# Patient Record
Sex: FEMALE | Race: WHITE | URBAN - METROPOLITAN AREA
[De-identification: names, ages, dates, MRNs, and addresses within clinical notes are randomized per-mention and may not be internally consistent; named-entity substitution may affect disease eponyms.]

---

## 2017-03-23 ENCOUNTER — TELEPHONE (OUTPATIENT)
Dept: INTERNAL MEDICINE CLINIC | Age: 47
End: 2017-03-23

## 2017-03-23 NOTE — TELEPHONE ENCOUNTER
TC: pt states Tuesday she woke up with a spot on her lip, looked like a cold sore , tingling , red and swollen this am, also tingling going up her face, concerns, lives in [facility], has Urgent Care there , advised to walk in and be evaluated pt agrees, no other symptyoms.

## 2017-03-29 ENCOUNTER — OFFICE VISIT (OUTPATIENT)
Dept: INTERNAL MEDICINE CLINIC | Age: 47
End: 2017-03-29

## 2017-03-29 VITALS
HEIGHT: 69 IN | RESPIRATION RATE: 18 BRPM | HEART RATE: 75 BPM | OXYGEN SATURATION: 99 % | SYSTOLIC BLOOD PRESSURE: 105 MMHG | TEMPERATURE: 98.5 F | DIASTOLIC BLOOD PRESSURE: 66 MMHG | WEIGHT: 144 LBS | BODY MASS INDEX: 21.33 KG/M2

## 2017-03-29 DIAGNOSIS — E55.9 VITAMIN D DEFICIENCY: ICD-10-CM

## 2017-03-29 DIAGNOSIS — Z00.00 WELL ADULT EXAM: ICD-10-CM

## 2017-03-29 DIAGNOSIS — B02.9 HERPES ZOSTER WITHOUT COMPLICATION: Primary | ICD-10-CM

## 2017-03-29 DIAGNOSIS — E03.4 HYPOTHYROIDISM DUE TO ACQUIRED ATROPHY OF THYROID: ICD-10-CM

## 2017-03-29 RX ORDER — HYDROCODONE BITARTRATE AND ACETAMINOPHEN 5; 325 MG/1; MG/1
TABLET ORAL
COMMUNITY
End: 2018-04-20 | Stop reason: ALTCHOICE

## 2017-03-29 RX ORDER — VALACYCLOVIR HYDROCHLORIDE 1 G/1
1000 TABLET, FILM COATED ORAL 3 TIMES DAILY
COMMUNITY
End: 2018-04-20 | Stop reason: ALTCHOICE

## 2017-03-29 RX ORDER — CLONAZEPAM 0.5 MG/1
0.25 TABLET ORAL
Qty: 15 TAB | Refills: 0 | Status: SHIPPED | OUTPATIENT
Start: 2017-03-29 | End: 2019-04-22 | Stop reason: ALTCHOICE

## 2017-03-29 RX ORDER — MELATONIN
DAILY
COMMUNITY
End: 2017-03-29 | Stop reason: SDUPTHER

## 2017-03-29 RX ORDER — MELATONIN
2000 DAILY
Qty: 90 TAB | Refills: 0
Start: 2017-03-29

## 2017-03-29 NOTE — PROGRESS NOTES
Chief Complaint   Patient presents with    Thyroid Problem    Anxiety       shingles  Since last visit she diagnosed with Shingles at [address] [place] [address] clinic this past weekend 3/25/17  She is currently on valtrex and hydrocodone. She had to take the hydrocodone due to sharp pains. She denies vision or hearing problems. She felt some bumps at inner right ear prior to eruption but not painful. thyroid  SUBJECTIVE: Leocadia Leyden is a 48 y.o. female here for follow up of hypothyroidism. Lab Results   Component Value Date/Time    TSH 4.330 04/18/2016 09:59 AM     Thyroid ROS: fatigue  Denies weight changes but intentional weight loss of 20 pounds, [facility] 28 day detox, able to keep it off  No palpitions, no heat intolerance. She did have episode of increased heat and possible panic attack when with son visiting colleges    Anxiety  She sleeps 7 hours and feels well rested.   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  Not exercising, not irritible, able to focus with her work still  Son wants to go to [employer] and will be graduating this year    IBS  Stable    Vitamin deficincy   Stopped since detox        Past Medical History:   Diagnosis Date    Bulging disc     Cervical     Hypothyroid     IBS (irritable bowel syndrome)     Shingles      Past Surgical History:   Procedure Laterality Date    HX TONSILLECTOMY       Social History     Social History    Marital status:      Spouse name: N/A    Number of children: N/A    Years of education: N/A     Occupational History          Social History Main Topics    Smoking status: Never Smoker    Smokeless tobacco: Never Used    Alcohol use No    Drug use: None    Sexual activity: Not Asked     Other Topics Concern    None     Social History Narrative    Full time:  [employer]    2 depts shutting down     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  valACYclovir (VALTREX) 1 gram tablet Take 1,000 mg by mouth three (3) times daily.  HYDROcodone-acetaminophen (NORCO) 5-325 mg per tablet Take  by mouth every six (6) hours as needed for Pain.  levothyroxine (SYNTHROID) 75 mcg tablet Take 1 Tab by mouth Daily (before breakfast). 90 Tab 1    dicyclomine (BENTYL) 10 mg capsule Take 1 Cap by mouth four (4) times daily. prn (Patient taking differently: Take 10 mg by mouth as needed. prn) 30 Cap 1     No Known Allergies    Review of Systems - General ROS: negative for - chills, fatigue, fever, hot flashes, malaise, night sweats or sleep disturbance  Cardiovascular ROS: no chest pain or dyspnea on exertion  Respiratory ROS: no cough, shortness of breath, or wheezing    Visit Vitals    /66 (BP 1 Location: Left arm, BP Patient Position: Sitting)    Pulse 75    Temp 98.5 °F (36.9 °C) (Oral)    Resp 18    Ht 5' 8.5\" (1.74 m)    Wt 144 lb (65.3 kg)    SpO2 99%    BMI 21.58 kg/m2     General Appearance:  Well developed, well nourished,alert and oriented x 3, and individual in no acute distress. Ears/Nose/Mouth/Throat:   Hearing grossly normal.right 1.5 mm cyst x2 at inner right pinna, no blisters, no erythema         Neck: Supple, no lad, no bruits   Chest:   Lungs clear to auscultation bilaterally. Cardiovascular:  Regular rate and rhythm, S1, S2 normal, no murmur. Abdomen:   Soft, non-tender, bowel sounds are active. Extremities: No edema bilaterally.     Skin: Warm and dry, no suspicious lesions, Crusted blisters at right upper lip into right nares, erythema right maxillary area not extending to right eye or ear                 Marquita Ríos was seen today for thyroid problem and anxiety. Diagnoses and all orders for this visit:    Herpes zoster without complication  Cont valtrex  Contact precautions discussed  -     CBC W/O DIFF  -     METABOLIC PANEL, COMPREHENSIVE    Hypothyroidism due to acquired atrophy of thyroid  Needs recheck of labs  Possible palptiation associated with thyroid  -     TSH 3RD GENERATION    Anxiety  Appears to have situational stress not chronic malcom. Does not need daily maintance as she will start exercising. She may need clonazepam prn. If using clonazepam frequently will transition to ssri, pt aware. -     clonazePAM (KLONOPIN) 0.5 mg tablet; Take 0.5 Tabs by mouth two (2) times daily as needed. Max Daily Amount: 0.5 mg.      Vitamin D deficiency  -     METABOLIC PANEL, COMPREHENSIVE  -     VITAMIN D, 25 HYDROXY    Well adult exam  -     CBC W/O DIFF  -     METABOLIC PANEL, COMPREHENSIVE  -     LIPID PANEL  -     TSH 3RD GENERATION  -     VITAMIN D, 25 HYDROXY    Other orders  -     cholecalciferol (VITAMIN D3) 1,000 unit tablet; Take 2 Tabs by mouth daily. This note will not be viewable in [address]

## 2017-03-29 NOTE — MR AVS SNAPSHOT
Visit Information Date & Time Provider Department Dept. Phone Encounter #  
 3/29/2017 12:45 PM Areli Purdy MD [facility] [address] [id number] Upcoming Health Maintenance Date Due DTaP/Tdap/Td series (1 - Tdap) 5/8/1989 INFLUENZA AGE 9 TO ADULT 8/1/2016 PAP AKA CERVICAL CYTOLOGY 1/15/2017 Allergies as of 3/29/2017  Review Complete On: 3/29/2017 By: Areli Purdy MD  
 No Known Allergies Current Immunizations  Reviewed on 12/8/2014 Name Date Influenza Vaccine 10/28/2014 Not reviewed this visit You Were Diagnosed With   
  
 Codes Comments Herpes zoster without complication    -  Primary ICD-10-CM: B02.9 ICD-9-CM: 053.9 Hypothyroidism due to acquired atrophy of thyroid     ICD-10-CM: E03.4 ICD-9-CM: 244.8, 246.8 Vitamin D deficiency     ICD-10-CM: E55.9 ICD-9-CM: 268.9 Well adult exam     ICD-10-CM: Z00.00 ICD-9-CM: V70.0 Vitals BP Pulse Temp Resp Height(growth percentile) Weight(growth percentile) 105/66 (BP 1 Location: Left arm, BP Patient Position: Sitting) 75 98.5 °F (36.9 °C) (Oral) 18 5' 8.5\" (1.74 m) 144 lb (65.3 kg) SpO2 BMI OB Status Smoking Status 99% 21.58 kg/m2 IUD Never Smoker Vitals History BMI and BSA Data Body Mass Index Body Surface Area  
 21.58 kg/m 2 1.78 m 2 Preferred Pharmacy Pharmacy Name Phone [facility] [address] [phone] Your Updated Medication List  
  
   
This list is accurate as of: 3/29/17  1:42 PM.  Always use your most recent med list.  
  
  
  
  
 cholecalciferol 1,000 unit tablet Commonly known as:  VITAMIN D3 Take 2 Tabs by mouth daily. clonazePAM 0.5 mg tablet Commonly known as:  Charlotte David Take 0.5 Tabs by mouth two (2) times daily as needed. Max Daily Amount: 0.5 mg.  
  
 dicyclomine 10 mg capsule Commonly known as:  BENTYL Take 1 Cap by mouth four (4) times daily. prn HYDROcodone-acetaminophen 5-325 mg per tablet Commonly known as:  Jim Dolphin Take  by mouth every six (6) hours as needed for Pain.  
  
 levothyroxine 75 mcg tablet Commonly known as:  SYNTHROID Take 1 Tab by mouth Daily (before breakfast). valACYclovir 1 gram tablet Commonly known as:  VALTREX Take 1,000 mg by mouth three (3) times daily. Prescriptions Printed Refills  
 clonazePAM (KLONOPIN) 0.5 mg tablet 0 Sig: Take 0.5 Tabs by mouth two (2) times daily as needed. Max Daily Amount: 0.5 mg.  
 Class: Print Route: Oral  
  
We Performed the Following CBC W/O DIFF [71902 CPT(R)] LIPID PANEL [81995 CPT(R)] METABOLIC PANEL, COMPREHENSIVE [46464 CPT(R)] TSH 3RD GENERATION [76821 CPT(R)] VITAMIN D, 25 HYDROXY [id number] CPT(R)] Introducing [facility]! Dear Krystal Sinha: Thank you for requesting a [employer] account. Our records indicate that you already have an active [employer] account. You can access your account anytime at [email or web address] Did you know that you can access your hospital and ER discharge instructions at any time in [employer]? You can also review all of your test results from your hospital stay or ER visit. Additional Information If you have questions, please visit the Frequently Asked Questions section of the [employer] website at [email or web address] Remember, [employer] is NOT to be used for urgent needs. For medical emergencies, dial 911. Now available from your iPhone and Android! Please provide this summary of care documentation to your next provider. Your primary care clinician is listed as Chrissy Pa. If you have any questions after today's visit, please call [phone].

## 2017-03-30 LAB
25(OH)D3+25(OH)D2 SERPL-MCNC: 46.8 NG/ML (ref 30–100)
ALBUMIN SERPL-MCNC: 4.7 G/DL (ref 3.5–5.5)
ALBUMIN/GLOB SERPL: 1.8 {RATIO} (ref 1.2–2.2)
ALP SERPL-CCNC: 68 IU/L (ref 39–117)
ALT SERPL-CCNC: 33 IU/L (ref 0–32)
AST SERPL-CCNC: 32 IU/L (ref 0–40)
BILIRUB SERPL-MCNC: 0.6 MG/DL (ref 0–1.2)
BUN SERPL-MCNC: 20 MG/DL (ref 6–24)
BUN/CREAT SERPL: 30 (ref 9–23)
CALCIUM SERPL-MCNC: 9.7 MG/DL (ref 8.7–10.2)
CHLORIDE SERPL-SCNC: 99 MMOL/L (ref 96–106)
CHOLEST SERPL-MCNC: 204 MG/DL (ref 100–199)
CO2 SERPL-SCNC: 28 MMOL/L (ref 18–29)
CREAT SERPL-MCNC: 0.66 MG/DL (ref 0.57–1)
ERYTHROCYTE [DISTWIDTH] IN BLOOD BY AUTOMATED COUNT: 13.7 % (ref 12.3–15.4)
GLOBULIN SER CALC-MCNC: 2.6 G/DL (ref 1.5–4.5)
GLUCOSE SERPL-MCNC: 84 MG/DL (ref 65–99)
HCT VFR BLD AUTO: 39.9 % (ref 34–46.6)
HDLC SERPL-MCNC: 60 MG/DL
HGB BLD-MCNC: 13.5 G/DL (ref 11.1–15.9)
INTERPRETATION, 910389: NORMAL
LDLC SERPL CALC-MCNC: 128 MG/DL (ref 0–99)
MCH RBC QN AUTO: 29.9 PG (ref 26.6–33)
MCHC RBC AUTO-ENTMCNC: 33.8 G/DL (ref 31.5–35.7)
MCV RBC AUTO: 89 FL (ref 79–97)
PLATELET # BLD AUTO: 256 X10E3/UL (ref 150–379)
POTASSIUM SERPL-SCNC: 4.8 MMOL/L (ref 3.5–5.2)
PROT SERPL-MCNC: 7.3 G/DL (ref 6–8.5)
RBC # BLD AUTO: 4.51 X10E6/UL (ref 3.77–5.28)
SODIUM SERPL-SCNC: 140 MMOL/L (ref 134–144)
TRIGL SERPL-MCNC: 78 MG/DL (ref 0–149)
TSH SERPL DL<=0.005 MIU/L-ACNC: 2.67 UIU/ML (ref 0.45–4.5)
VLDLC SERPL CALC-MCNC: 16 MG/DL (ref 5–40)
WBC # BLD AUTO: 9 X10E3/UL (ref 3.4–10.8)

## 2017-05-12 RX ORDER — LEVOTHYROXINE SODIUM 75 UG/1
TABLET ORAL
Qty: 90 TAB | Refills: 1 | Status: SHIPPED | OUTPATIENT
Start: 2017-05-12 | End: 2017-11-15 | Stop reason: SDUPTHER

## 2017-09-28 ENCOUNTER — OFFICE VISIT (OUTPATIENT)
Dept: INTERNAL MEDICINE CLINIC | Age: 47
End: 2017-09-28

## 2017-09-28 VITALS
DIASTOLIC BLOOD PRESSURE: 78 MMHG | BODY MASS INDEX: 22.22 KG/M2 | HEIGHT: 69 IN | TEMPERATURE: 97.6 F | WEIGHT: 150 LBS | RESPIRATION RATE: 12 BRPM | SYSTOLIC BLOOD PRESSURE: 122 MMHG | HEART RATE: 60 BPM

## 2017-09-28 DIAGNOSIS — R81 GLUCOSURIA: ICD-10-CM

## 2017-09-28 DIAGNOSIS — E03.4 HYPOTHYROIDISM DUE TO ACQUIRED ATROPHY OF THYROID: ICD-10-CM

## 2017-09-28 DIAGNOSIS — N30.90 CYSTITIS: ICD-10-CM

## 2017-09-28 DIAGNOSIS — R53.83 FATIGUE, UNSPECIFIED TYPE: Primary | ICD-10-CM

## 2017-09-28 NOTE — MR AVS SNAPSHOT
Visit Information Date & Time Provider Department Dept. Phone Encounter #  
 9/28/2017  8:20 AM Nolvia Castelan MD [facility] [address] [id number] Upcoming Health Maintenance Date Due DTaP/Tdap/Td series (1 - Tdap) 5/8/1989 PAP AKA CERVICAL CYTOLOGY 1/15/2017 INFLUENZA AGE 9 TO ADULT 8/1/2017 Allergies as of 9/28/2017  Review Complete On: 9/28/2017 By: Nolvia Castelan MD  
 No Known Allergies Current Immunizations  Reviewed on 12/8/2014 Name Date Influenza Vaccine 10/28/2014 Not reviewed this visit You Were Diagnosed With   
  
 Codes Comments Fatigue, unspecified type    -  Primary ICD-10-CM: R53.83 ICD-9-CM: 780.79 Cystitis     ICD-10-CM: N30.90 ICD-9-CM: 595.9 Hypothyroidism due to acquired atrophy of thyroid     ICD-10-CM: E03.4 ICD-9-CM: 244.8, 246.8 Glucosuria     ICD-10-CM: R81 
ICD-9-CM: 791.5 Vitals BP Pulse Temp Resp Height(growth percentile) Weight(growth percentile) 122/78 (BP 1 Location: Left arm, BP Patient Position: Sitting) 60 97.6 °F (36.4 °C) (Oral) 12 5' 8.5\" (1.74 m) 150 lb (68 kg) BMI OB Status Smoking Status 22.48 kg/m2 IUD Never Smoker Vitals History BMI and BSA Data Body Mass Index Body Surface Area  
 22.48 kg/m 2 1.81 m 2 Preferred Pharmacy Pharmacy Name Phone [facility] [phone] Your Updated Medication List  
  
   
This list is accurate as of: 9/28/17  9:06 AM.  Always use your most recent med list.  
  
  
  
  
 cholecalciferol 1,000 unit tablet Commonly known as:  VITAMIN D3 Take 2 Tabs by mouth daily. clonazePAM 0.5 mg tablet Commonly known as:  Richard Knoxerman Take 0.5 Tabs by mouth two (2) times daily as needed. Max Daily Amount: 0.5 mg.  
  
 dicyclomine 10 mg capsule Commonly known as:  BENTYL TAKE 1 CAPSULE BY MOUTH FOUR TIMES DAILY AS NEEDED HYDROcodone-acetaminophen 5-325 mg per tablet Commonly known as:  Maeve Antwan Take  by mouth every six (6) hours as needed for Pain.  
  
 levothyroxine 75 mcg tablet Commonly known as:  SYNTHROID  
TAKE 1 TAB BY MOUTH DAILY (BEFORE BREAKFAST). valACYclovir 1 gram tablet Commonly known as:  VALTREX Take 1,000 mg by mouth three (3) times daily. We Performed the Following CULTURE, URINE B0138172 CPT(R)] HEMOGLOBIN A1C WITH EAG [77397 CPT(R)] METABOLIC PANEL, BASIC [62407 CPT(R)] SED RATE (ESR) [id number] CPT(R)] TSH 3RD GENERATION [95516 CPT(R)] URINALYSIS W/ RFLX MICROSCOPIC [61639 CPT(R)] Introducing [facility] [place] [facility]! Dear Nolberto Garcia: Thank you for requesting a [employer] account. Our records indicate that you already have an active [employer] account. You can access your account anytime at [email or web address] Did you know that you can access your hospital and ER discharge instructions at any time in [employer]? You can also review all of your test results from your hospital stay or ER visit. Additional Information If you have questions, please visit the Frequently Asked Questions section of the [employer] website at [email or web address] Remember, [employer] is NOT to be used for urgent needs. For medical emergencies, dial 911. Now available from your iPhone and Android! Please provide this summary of care documentation to your next provider. Your primary care clinician is listed as Margaret Candelaria. If you have any questions after today's visit, please call [phone].

## 2017-09-28 NOTE — PROGRESS NOTES
Chief Complaint   Patient presents with    Pelvic Pain     saw Gyn     Bladder pain  Pt presents with 6 week hx of burning and itching and lower pelvic pain . She went [facility] but did not have plevic but diagnosed with fungal infection, neg for uti. Pt to gyn, dr. Yecenia Rodas found glucose in urine and bs 150 non fasting. On pelvic exam pain was found to be at bladder area. She had Trace of sugar in urine. She has mirena but no problems per gyn. She was put on macrobid for 7 days with relief of sharp shooting pains but other sx remained. No fevers, no night sweat, + intercourse not affecting. She reports hx of gestational dm. Stools not regularly every few days, sometimes has to push to get stool, straight stools. Yesterday stooled pasty      Fatigue  2-3 months since her son has been in college  Energy level 5/10 baseline 9/10  Sleeping 7.5 hours  Not exercising  Coffee 2 cups/day    SUBJECTIVE: Ever Law is a 50 y.o. female here for follow up of hypothyroidism. Lab Results   Component Value Date/Time    TSH 2.670 03/29/2017 01:54 PM     Thyroid ROS: denies fatigue, weight changes, heat/cold intolerance, bowel/skin changes or CVS symptoms.          Last menses 10 years but on mirena    Son went to college  Lack of energy  Persisted        Past Medical History:   Diagnosis Date    Bulging disc     Cervical     Hypothyroid     IBS (irritable bowel syndrome)     Seizure (HCC)     13 years ago with daugther    Shingles      Past Surgical History:   Procedure Laterality Date    HX TONSILLECTOMY       Social History     Social History    Marital status:      Spouse name: N/A    Number of children: N/A    Years of education: N/A     Occupational History          Social History Main Topics    Smoking status: Never Smoker    Smokeless tobacco: Never Used    Alcohol use No    Drug use: None    Sexual activity: Not Asked     Other Topics Concern    None     Social History Narrative    Full time:  [employer]    2 depts shutting down     HR        Children: healthy 25 sonand 15     Family History   Problem Relation Age of Onset    Hypertension Mother     Hypertension Father     High Cholesterol Father     Cancer Paternal Grandmother      hodgkins    Cancer Paternal Grandfather      prostate    Diabetes Neg Hx     Heart Disease Neg Hx      Current Outpatient Prescriptions   Medication Sig Dispense Refill    levothyroxine (SYNTHROID) 75 mcg tablet TAKE 1 TAB BY MOUTH DAILY (BEFORE BREAKFAST). 90 Tab 1    dicyclomine (BENTYL) 10 mg capsule TAKE 1 CAPSULE BY MOUTH FOUR TIMES DAILY AS NEEDED 30 Cap 1    valACYclovir (VALTREX) 1 gram tablet Take 1,000 mg by mouth three (3) times daily.  HYDROcodone-acetaminophen (NORCO) 5-325 mg per tablet Take  by mouth every six (6) hours as needed for Pain.  cholecalciferol (VITAMIN D3) 1,000 unit tablet Take 2 Tabs by mouth daily. 90 Tab 0    clonazePAM (KLONOPIN) 0.5 mg tablet Take 0.5 Tabs by mouth two (2) times daily as needed. Max Daily Amount: 0.5 mg. 15 Tab 0     No Known Allergies    Review of Systems - General ROS: positive for  - fatigue, malaise and sleep disturbance  Cardiovascular ROS: no chest pain or dyspnea on exertion  Respiratory ROS: no cough, shortness of breath, or wheezing    Visit Vitals    /78 (BP 1 Location: Left arm, BP Patient Position: Sitting)    Pulse 60    Temp 97.6 °F (36.4 °C) (Oral)    Resp 12    Ht 5' 8.5\" (1.74 m)    Wt 150 lb (68 kg)    BMI 22.48 kg/m2     General Appearance:  Well developed, well nourished,alert and oriented x 3, and individual in no acute distress. Ears/Nose/Mouth/Throat:   Hearing grossly normal.         Neck: Supple, no lad, no bruits   Chest:   Lungs clear to auscultation bilaterally. Cardiovascular:  Regular rate and rhythm, S1, S2 normal, no murmur. Abdomen:   Soft, non-tender, bowel sounds are active.    Extremities: No edema bilaterally. Skin: Warm and dry, no suspicious lesions                 Diagnoses and all orders for this visit:    1. Fatigue, unspecified type  No fevers night sweats or weight loss  Possibly thryoid as well  Perimenopausal contributing  -     SED RATE (ESR)    2. Cystitis  Possible interstitial cystitis will possibly have evalution by [facility] urology   -     SED RATE (ESR)  -     URINALYSIS W/ RFLX MICROSCOPIC  -     CULTURE, URINE    3. Hypothyroidism due to acquired atrophy of thyroid  -     TSH 3RD GENERATION    4. Glucosuria  Will assess   -     CULTURE, URINE  -     METABOLIC PANEL, BASIC  -     HEMOGLOBIN A1C WITH EAG    I spent 25 min with this patient and >50% of the time was spent on counseling and management of pt's sx of possible interstitial cystitis, r/o constipation, glucosuria and evaluation for dm. No fevers, night sweats for weight loss    This note will not be viewable in MyChart.

## 2017-09-29 LAB
APPEARANCE UR: ABNORMAL
BILIRUB UR QL STRIP: NEGATIVE
BUN SERPL-MCNC: 18 MG/DL (ref 6–24)
BUN/CREAT SERPL: 22 (ref 9–23)
CALCIUM SERPL-MCNC: 9.4 MG/DL (ref 8.7–10.2)
CHLORIDE SERPL-SCNC: 100 MMOL/L (ref 96–106)
CO2 SERPL-SCNC: 24 MMOL/L (ref 18–29)
COLOR UR: YELLOW
CREAT SERPL-MCNC: 0.81 MG/DL (ref 0.57–1)
ERYTHROCYTE [SEDIMENTATION RATE] IN BLOOD BY WESTERGREN METHOD: 2 MM/HR (ref 0–32)
EST. AVERAGE GLUCOSE BLD GHB EST-MCNC: 97 MG/DL
GLUCOSE SERPL-MCNC: 81 MG/DL (ref 65–99)
GLUCOSE UR QL: NEGATIVE
HBA1C MFR BLD: 5 % (ref 4.8–5.6)
HGB UR QL STRIP: NEGATIVE
KETONES UR QL STRIP: ABNORMAL
LEUKOCYTE ESTERASE UR QL STRIP: NEGATIVE
MICRO URNS: ABNORMAL
NITRITE UR QL STRIP: NEGATIVE
PH UR STRIP: 6 [PH] (ref 5–7.5)
POTASSIUM SERPL-SCNC: 4.1 MMOL/L (ref 3.5–5.2)
PROT UR QL STRIP: NEGATIVE
SODIUM SERPL-SCNC: 139 MMOL/L (ref 134–144)
SP GR UR: 1.02 (ref 1–1.03)
TSH SERPL DL<=0.005 MIU/L-ACNC: 2.93 UIU/ML (ref 0.45–4.5)
UROBILINOGEN UR STRIP-MCNC: 1 MG/DL (ref 0.2–1)

## 2017-09-30 LAB — BACTERIA UR CULT: NO GROWTH

## 2018-04-04 ENCOUNTER — PATIENT MESSAGE (OUTPATIENT)
Dept: INTERNAL MEDICINE CLINIC | Age: 48
End: 2018-04-04

## 2018-04-05 RX ORDER — LEVOTHYROXINE SODIUM 75 UG/1
75 TABLET ORAL
Qty: 90 TAB | Refills: 1 | Status: SHIPPED | OUTPATIENT
Start: 2018-04-05 | End: 2018-04-20 | Stop reason: SDUPTHER

## 2018-04-05 NOTE — TELEPHONE ENCOUNTER
From: Sean Baires  To: Fredo Soares MD  Sent: 4/4/2018 1:33 PM EDT  Subject: Prescription Question    levothyroxine 75 mcg tablet    I am currently on my last refill. Do I need to come in before receiving another refill?

## 2018-04-20 ENCOUNTER — OFFICE VISIT (OUTPATIENT)
Dept: INTERNAL MEDICINE CLINIC | Age: 48
End: 2018-04-20

## 2018-04-20 VITALS
BODY MASS INDEX: 24.71 KG/M2 | TEMPERATURE: 97.9 F | SYSTOLIC BLOOD PRESSURE: 125 MMHG | WEIGHT: 163 LBS | OXYGEN SATURATION: 97 % | RESPIRATION RATE: 16 BRPM | HEART RATE: 60 BPM | HEIGHT: 68 IN | DIASTOLIC BLOOD PRESSURE: 78 MMHG

## 2018-04-20 DIAGNOSIS — Z00.00 WELL ADULT EXAM: Primary | ICD-10-CM

## 2018-04-20 DIAGNOSIS — K58.9 IRRITABLE BOWEL SYNDROME WITHOUT DIARRHEA: ICD-10-CM

## 2018-04-20 DIAGNOSIS — E55.9 VITAMIN D DEFICIENCY: ICD-10-CM

## 2018-04-20 DIAGNOSIS — E03.4 HYPOTHYROIDISM DUE TO ACQUIRED ATROPHY OF THYROID: ICD-10-CM

## 2018-04-20 RX ORDER — LEVOTHYROXINE SODIUM 75 UG/1
75 TABLET ORAL
Qty: 90 TAB | Refills: 1 | Status: SHIPPED | OUTPATIENT
Start: 2018-04-20 | End: 2018-08-20 | Stop reason: SDUPTHER

## 2018-04-20 NOTE — PROGRESS NOTES
Chief Complaint   Patient presents with    Complete Physical       Pt presents requesting physical. She reports overall good health. 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 Bladder pain  She was seen by urology and after work up recommended [address]. She reports when her stress is lower and less fatigued her sx are not as prominent. IC diet    Fatigue  5-6 /10 energy level  She would like to have higher but attributes to increased activity and work       SUBJECTIVE: Jayla Garcia is a 50 y.o. female here for follow up of hypothyroidism. Lab Results   Component Value Date/Time    TSH 2.930 09/28/2017 09:23 AM     Thyroid ROS: denies fatigue, weight changes, heat/cold intolerance, bowel/skin changes or CVS symptoms. Last menses 10 years but on mirena      IBS  She uses bentyl sparingly. She does not use every week and a few times every month  No blood in stool. No change in bowel habits    She has concerns about memory as their is dementia in her family.        Past Medical History:   Diagnosis Date    Bulging disc     Cervical     Hypothyroid     IBS (irritable bowel syndrome)     Seizure ([address]     13 years ago with daugther    Shingles      Past Surgical History:   Procedure Laterality Date    HX TONSILLECTOMY       Social History     Social History    Marital status:      Spouse name: N/A    Number of children: N/A    Years of education: N/A     Occupational History          Social History Main Topics    Smoking status: Never Smoker    Smokeless tobacco: Never Used    Alcohol use No    Drug use: None    Sexual activity: Not Asked     Other Topics Concern    None     Social History Narrative    Full time:  [employer]    2 depts shutting down     HR        Children: healthy 18 sonand 12     Family History   Problem Relation Age of Onset    Hypertension Mother     Hypertension Father     High Cholesterol Father     Cancer Paternal Grandmother      hodgkins    Cancer Paternal Grandfather      prostate    Diabetes Neg Hx     Heart Disease Neg Hx      Current Outpatient Prescriptions   Medication Sig Dispense Refill    MULTIVIT-MINERALS/FOLIC ACID (ADULT MULTIVITAMIN GUMMIES PO) Take  by mouth.  levothyroxine (SYNTHROID) 75 mcg tablet Take 1 Tab by mouth Daily (before breakfast). 90 Tab 1    dicyclomine (BENTYL) 10 mg capsule TAKE 1 CAPSULE BY MOUTH FOUR TIMES DAILY AS NEEDED 30 Cap 1    valACYclovir (VALTREX) 1 gram tablet Take 1,000 mg by mouth three (3) times daily.  HYDROcodone-acetaminophen (NORCO) 5-325 mg per tablet Take  by mouth every six (6) hours as needed for Pain.  cholecalciferol (VITAMIN D3) 1,000 unit tablet Take 2 Tabs by mouth daily. 90 Tab 0    clonazePAM (KLONOPIN) 0.5 mg tablet Take 0.5 Tabs by mouth two (2) times daily as needed. Max Daily Amount: 0.5 mg. 15 Tab 0     No Known Allergies    Review of Systems - General ROS: positive for  - fatigue, malaise and sleep disturbance  Cardiovascular ROS: no chest pain or dyspnea on exertion  Respiratory ROS: no cough, shortness of breath, or wheezing    Visit Vitals    /78 (BP 1 Location: Right arm, BP Patient Position: Sitting)    Pulse 60    Temp 97.9 °F (36.6 °C)    Resp 16    Ht 5' 8\" (1.727 m)    Wt 163 lb (73.9 kg)    LMP  (LMP Unknown)    SpO2 97%    BMI 24.78 kg/m2     General Appearance:  Well developed, well nourished,alert and oriented x 3, and individual in no acute distress. Ears/Nose/Mouth/Throat:   Hearing grossly normal.         Neck: Supple, no lad, no bruits   Chest:   Lungs clear to auscultation bilaterally. Cardiovascular:  Regular rate and rhythm, S1, S2 normal, no murmur. Abdomen:   Soft, non-tender, bowel sounds are active. Extremities: No edema bilaterally.     Skin: Warm and dry, no suspicious lesions  Fair skin Prevention    Cardiovascular profile  Office treadmill not using  Before 38 she use to work out videos she is getting back to regular exercise  Weight stable        Cancer risk profile  Mammogram will do with her gyn  Lung no sx  Colonoscopy during IBS workup 2011, stable with bentyl prn  Skin nonhealing in 2 weeks  Gyn abnormal bleeding/discharge/abd pain/pressure Dr. Evelyn Simpson, [facility]      Thyroid sx  Weight stable    Osteopenia prevention  Calcium 1000mg/day yes  Vitamin D 800iu/day hx of vitamin D deficiency    Mental health scale: 8-9/10, sleeping well       Immunizations  TDAP 2010  Pneumonia vaccine  Flu vaccine annually  Shingles vaccine        Diagnoses and all orders for this visit:    1. Well adult exam  -     REFERRAL TO DERMATOLOGY    Aside for prevention wellness visit, I spent additional time with pt to follow up on her thryoid, IBS and IC.  recommneded IC diet. We discussed weight loss with less carb and adequate protein and importance of exercise to prevent dementia per current studies. 2. Hypothyroidism due to acquired atrophy of thyroid  -     levothyroxine (SYNTHROID) 75 mcg tablet; Take 1 Tab by mouth Daily (before breakfast). -     TSH 3RD GENERATION    3. Irritable bowel syndrome without diarrhea    4. Vitamin D deficiency  -     VITAMIN D, 25 HYDROXY  -     LIPID PANEL; Future  -     METABOLIC PANEL, COMPREHENSIVE; Future    This note will not be viewable in MyChart.

## 2018-04-20 NOTE — PROGRESS NOTES
Alex Rodriguez is a 50 y.o. female    Chief Complaint   Patient presents with    Complete Physical     Fasting   1. Have you been to the ER, urgent care clinic since your last visit? Hospitalized since your last visit? No    2. Have you seen or consulted any other health care providers outside of the [address] [place] [address] since your last visit? Include any pap smears or colon screening.  No    Health Maintenance Due   Topic Date Due    DTaP/Tdap/Td series (1 - Tdap) 05/08/1989    PAP AKA CERVICAL CYTOLOGY  01/15/2017    Influenza Age 9 to Adult  08/01/2017

## 2018-04-20 NOTE — MR AVS SNAPSHOT
[facility] 
 
 
 [address] [place] [address] Farzana Luis [facility] 
[phone] Patient: Annabelle Gaxiola MRN: [id number] Visit Information Date & Time Provider Department Dept. Phone Encounter #  
 4/20/2018 11:40 AM Mick Ponce MD [facility] [id number] Upcoming Health Maintenance Date Due DTaP/Tdap/Td series (1 - Tdap) 5/8/1989 PAP AKA CERVICAL CYTOLOGY 1/15/2017 Influenza Age 5 to Adult 8/1/2017 Allergies as of 4/20/2018  Review Complete On: 4/20/2018 By: Mick Ponce MD  
 No Known Allergies Current Immunizations  Reviewed on 12/8/2014 Name Date Influenza Vaccine 10/28/2014 Not reviewed this visit You Were Diagnosed With   
  
 Codes Comments Well adult exam    -  Primary ICD-10-CM: Z00.00 ICD-9-CM: V70.0 Hypothyroidism due to acquired atrophy of thyroid     ICD-10-CM: E03.4 ICD-9-CM: 244.8, 246.8 Irritable bowel syndrome without diarrhea     ICD-10-CM: K58.9 ICD-9-CM: 149.6 Vitamin D deficiency     ICD-10-CM: E55.9 ICD-9-CM: 268.9 Vitals BP Pulse Temp Resp Height(growth percentile) Weight(growth percentile) 125/78 (BP 1 Location: Right arm, BP Patient Position: Sitting) 60 97.9 °F (36.6 °C) 16 5' 8\" (1.727 m) 163 lb (73.9 kg) LMP SpO2 BMI OB Status Smoking Status (LMP Unknown) 97% 24.78 kg/m2 IUD Never Smoker BMI and BSA Data Body Mass Index Body Surface Area 24.78 kg/m 2 1.88 m 2 Preferred Pharmacy Pharmacy Name Phone [facility] [address] [phone] Your Updated Medication List  
  
   
This list is accurate as of 4/20/18 12:12 PM.  Always use your most recent med list.  
  
  
  
  
 ADULT MULTIVITAMIN GUMMIES PO Take  by mouth. cholecalciferol 1,000 unit tablet Commonly known as:  VITAMIN D3  
 Take 2 Tabs by mouth daily. clonazePAM 0.5 mg tablet Commonly known as:  Robina Mortimer Take 0.5 Tabs by mouth two (2) times daily as needed. Max Daily Amount: 0.5 mg.  
  
 dicyclomine 10 mg capsule Commonly known as:  BENTYL TAKE 1 CAPSULE BY MOUTH FOUR TIMES DAILY AS NEEDED  
  
 levothyroxine 75 mcg tablet Commonly known as:  SYNTHROID Take 1 Tab by mouth Daily (before breakfast). Prescriptions Sent to Pharmacy Refills  
 levothyroxine (SYNTHROID) 75 mcg tablet 1 Sig: Take 1 Tab by mouth Daily (before breakfast). Class: Normal  
 Pharmacy: [address] [place] [facility]  #: [phone] Route: Oral  
  
We Performed the Following REFERRAL TO DERMATOLOGY [REF19 Custom] Comments:  
 Skin cancer screening TSH 3RD GENERATION [49507 CPT(R)] VITAMIN D, 25 HYDROXY [id number] CPT(R)] To-Do List   
 04/20/2018 Lab:  LIPID PANEL   
  
 04/20/2018 Lab:  METABOLIC PANEL, COMPREHENSIVE Referral Information Referral ID Referred By Referred To  
  
 [id number] Paola Oswald MD   
   [id number] , [address] [place] [address] Phone: [phone] Fax: [phone] Visits Status Start Date End Date 1 New Request 4/20/18 4/20/19 If your referral has a status of pending review or denied, additional information will be sent to support the outcome of this decision. Introducing [facility]! Dear Harjit Griffith: Thank you for requesting a [employer] account. Our records indicate that you already have an active V[employer] account. You can access your account anytime at [email or web address] Did you know that you can access your hospital and ER discharge instructions at any time in [employer]? You can also review all of your test results from your hospital stay or ER visit. Additional Information If you have questions, please visit the Frequently Asked Questions section of the [employer] website at [email or web address] Remember, [employer] is NOT to be used for urgent needs. For medical emergencies, dial 911. Now available from your iPhone and Android! Please provide this summary of care documentation to your next provider. Your primary care clinician is listed as Karishma Chong. If you have any questions after today's visit, please call [phone].

## 2018-05-22 DIAGNOSIS — K58.9 IRRITABLE BOWEL SYNDROME, UNSPECIFIED TYPE: ICD-10-CM

## 2018-05-22 RX ORDER — DICYCLOMINE HYDROCHLORIDE 10 MG/1
CAPSULE ORAL
Qty: 30 CAP | Refills: 1 | Status: SHIPPED | OUTPATIENT
Start: 2018-05-22 | End: 2019-04-22 | Stop reason: SDUPTHER

## 2019-04-22 ENCOUNTER — OFFICE VISIT (OUTPATIENT)
Dept: INTERNAL MEDICINE CLINIC | Age: 49
End: 2019-04-22

## 2019-04-22 VITALS
RESPIRATION RATE: 14 BRPM | WEIGHT: 169.9 LBS | SYSTOLIC BLOOD PRESSURE: 112 MMHG | TEMPERATURE: 98.1 F | HEART RATE: 55 BPM | OXYGEN SATURATION: 97 % | DIASTOLIC BLOOD PRESSURE: 70 MMHG | BODY MASS INDEX: 25.75 KG/M2 | HEIGHT: 68 IN

## 2019-04-22 DIAGNOSIS — E78.00 ELEVATED CHOLESTEROL: ICD-10-CM

## 2019-04-22 DIAGNOSIS — E07.9 THYROID DISORDER: ICD-10-CM

## 2019-04-22 DIAGNOSIS — Z23 IMMUNIZATION DUE: Primary | ICD-10-CM

## 2019-04-22 DIAGNOSIS — K58.9 IRRITABLE BOWEL SYNDROME, UNSPECIFIED TYPE: ICD-10-CM

## 2019-04-22 RX ORDER — DICYCLOMINE HYDROCHLORIDE 10 MG/1
CAPSULE ORAL
Qty: 30 CAP | Refills: 1 | Status: SHIPPED | OUTPATIENT
Start: 2019-04-22

## 2019-04-22 NOTE — PROGRESS NOTES
Chief Complaint Patient presents with  Medication Evaluation Patient presents for follow-up with regards to her thyroid. Patient is now working as a  at her daughter's middle school. SUBJECTIVE: Trace Villa is a 46 y.o. female here for follow up of hypothyroidism. Lab Results Component Value Date/Time TSH 2.930 09/28/2017 09:23 AM  
 
Thyroid ROS: denies fatigue, weight changes, heat/cold intolerance, bowel/skin changes or CVS symptoms. Last menses 10 years but on mirena IBS She uses bentyl sparingly. She does not use every week and a few times every month No blood in stool. No change in bowel habits. She has not had any events of epigastric or stomach pain but likes to have the Bentyl available if needed. Past Medical History:  
Diagnosis Date  Bulging disc Cervical   
 Hypothyroid  IBS (irritable bowel syndrome)  Seizure ([address] 13 years ago with daugther  Shingles Past Surgical History:  
Procedure Laterality Date  HX TONSILLECTOMY Social History Socioeconomic History  Marital status:  Spouse name: Not on file  Number of children: Not on file  Years of education: Not on file  Highest education level: Not on file Occupational History  Occupation:  Tobacco Use  Smoking status: Never Smoker  Smokeless tobacco: Never Used Substance and Sexual Activity  Alcohol use: No  
Social History Narrative Full time:   
 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   
     hodgkins  Cancer Paternal Grandfather   
     prostate  Diabetes Neg Hx   
 Heart Disease Neg Hx Current Outpatient Medications Medication Sig Dispense Refill  levothyroxine (SYNTHROID) 75 mcg tablet TAKE 1 TAB BY MOUTH DAILY (BEFORE BREAKFAST). 90 Tab 0  
 dicyclomine (BENTYL) 10 mg capsule TAKE 1 CAPSULE BY MOUTH FOUR TIMES DAILY AS NEEDED 30 Cap 1  
 MULTIVIT-MINERALS/FOLIC ACID (ADULT MULTIVITAMIN GUMMIES PO) Take  by mouth.  cholecalciferol (VITAMIN D3) 1,000 unit tablet Take 2 Tabs by mouth daily. 90 Tab 0  clonazePAM (KLONOPIN) 0.5 mg tablet Take 0.5 Tabs by mouth two (2) times daily as needed. Max Daily Amount: 0.5 mg. 15 Tab 0 No Known Allergies Review of Systems - General ROS: positive for  - fatigue, malaise and sleep disturbance Cardiovascular ROS: no chest pain or dyspnea on exertion Respiratory ROS: no cough, shortness of breath, or wheezing Visit Vitals /70 (BP 1 Location: Left arm, BP Patient Position: Sitting) Pulse (!) 55 Temp 98.1 °F (36.7 °C) (Oral) Resp 14 Ht 5' 8\" (1.727 m) Wt 169 lb 14.4 oz (77.1 kg) SpO2 97% BMI 25.83 kg/m² General Appearance:  Well developed, well nourished,alert and oriented x 3, and individual in no acute distress. Ears/Nose/Mouth/Throat:   Hearing grossly normal. 
  
    Neck: Supple, no lad, no bruits Chest:   Lungs clear to auscultation bilaterally. Cardiovascular:  Regular rate and rhythm, S1, S2 normal, no murmur. Abdomen:   Soft, non-tender, bowel sounds are active. Extremities: No edema bilaterally. Skin: Warm and dry, no suspicious lesions Fair skin Prevention Cardiovascular profile Office treadmill not using Before 38 she use to work out videos she is getting back to regular exercise Weight stable Cancer risk profile Mammogram will do with her gyn Lung no sx Colonoscopy during IBS workup 2011, stable with bentyl prn Skin nonhealing in 2 weeks Gyn abnormal bleeding/discharge/abd pain/pressure Dr. Debra Lopez, [facility] Thyroid sx Weight stable Osteopenia prevention Calcium 1000mg/day yes Vitamin D 800iu/day hx of vitamin D deficiency Mental health scale: 8-9/10, sleeping well Immunizations TDAP 2010 Pneumonia vaccine Flu vaccine annually Shingles vaccine Diagnoses and all orders for this visit: 
 
1. Immunization due -     TETANUS, DIPHTHERIA TOXOIDS AND ACELLULAR PERTUSSIS VACCINE (TDAP), IN INDIVIDS. >=7, IM 2. Elevated cholesterol Patient cholesterol is very mildly elevated. She has not had her lipids run and would like to recheck. She is trying to be compliant with her diet. Encouraged continued heart healthy diet and exercise as tolerated. -     LIPID PANEL 
-     METABOLIC PANEL, COMPREHENSIVE 3. Thyroid disorder Appears to be clinically euthyroid Replete as needed 
-     TSH 3RD GENERATION 4. Irritable bowel syndrome, unspecified type Stable Symptoms have improved significantly since last visit. Patient has adjusted to her son being at college. -     dicyclomine (BENTYL) 10 mg capsule; TAKE 1 CAPSULE BY MOUTH FOUR TIMES DAILY AS NEEDED Follow-up for annual in 6 months or earlier if needed.

## 2019-04-23 LAB
ALBUMIN SERPL-MCNC: 4.4 G/DL (ref 3.5–5.5)
ALBUMIN/GLOB SERPL: 1.6 {RATIO} (ref 1.2–2.2)
ALP SERPL-CCNC: 60 IU/L (ref 39–117)
ALT SERPL-CCNC: 18 IU/L (ref 0–32)
AST SERPL-CCNC: 23 IU/L (ref 0–40)
BILIRUB SERPL-MCNC: 0.6 MG/DL (ref 0–1.2)
BUN SERPL-MCNC: 15 MG/DL (ref 6–24)
BUN/CREAT SERPL: 20 (ref 9–23)
CALCIUM SERPL-MCNC: 9.5 MG/DL (ref 8.7–10.2)
CHLORIDE SERPL-SCNC: 100 MMOL/L (ref 96–106)
CHOLEST SERPL-MCNC: 232 MG/DL (ref 100–199)
CO2 SERPL-SCNC: 27 MMOL/L (ref 20–29)
CREAT SERPL-MCNC: 0.75 MG/DL (ref 0.57–1)
GLOBULIN SER CALC-MCNC: 2.8 G/DL (ref 1.5–4.5)
GLUCOSE SERPL-MCNC: 83 MG/DL (ref 65–99)
HDLC SERPL-MCNC: 59 MG/DL
INTERPRETATION, 910389: NORMAL
LDLC SERPL CALC-MCNC: 158 MG/DL (ref 0–99)
POTASSIUM SERPL-SCNC: 5.4 MMOL/L (ref 3.5–5.2)
PROT SERPL-MCNC: 7.2 G/DL (ref 6–8.5)
SODIUM SERPL-SCNC: 138 MMOL/L (ref 134–144)
TRIGL SERPL-MCNC: 75 MG/DL (ref 0–149)
TSH SERPL DL<=0.005 MIU/L-ACNC: 3.6 UIU/ML (ref 0.45–4.5)
VLDLC SERPL CALC-MCNC: 15 MG/DL (ref 5–40)

## 2019-04-28 DIAGNOSIS — E78.2 MIXED HYPERLIPIDEMIA: Primary | ICD-10-CM

## 2019-05-21 RX ORDER — LEVOTHYROXINE SODIUM 75 UG/1
75 TABLET ORAL
Qty: 90 TAB | Refills: 1 | Status: SHIPPED | OUTPATIENT
Start: 2019-05-21 | End: 2019-08-20 | Stop reason: SDUPTHER

## 2019-08-20 RX ORDER — LEVOTHYROXINE SODIUM 75 UG/1
75 TABLET ORAL
Qty: 90 TAB | Refills: 1 | Status: SHIPPED | OUTPATIENT
Start: 2019-08-20 | End: 2020-03-16

## 2019-10-28 DIAGNOSIS — E78.2 MIXED HYPERLIPIDEMIA: ICD-10-CM
